# Patient Record
Sex: FEMALE | Race: WHITE | NOT HISPANIC OR LATINO | URBAN - METROPOLITAN AREA
[De-identification: names, ages, dates, MRNs, and addresses within clinical notes are randomized per-mention and may not be internally consistent; named-entity substitution may affect disease eponyms.]

---

## 2023-12-27 ENCOUNTER — APPOINTMENT (OUTPATIENT)
Dept: RADIOLOGY | Facility: HOSPITAL | Age: 68
End: 2023-12-27
Payer: MEDICARE

## 2023-12-27 ENCOUNTER — HOSPITAL ENCOUNTER (EMERGENCY)
Facility: HOSPITAL | Age: 68
Discharge: HOME | End: 2023-12-27
Attending: EMERGENCY MEDICINE
Payer: MEDICARE

## 2023-12-27 VITALS
SYSTOLIC BLOOD PRESSURE: 137 MMHG | HEIGHT: 62 IN | DIASTOLIC BLOOD PRESSURE: 78 MMHG | RESPIRATION RATE: 24 BRPM | HEART RATE: 89 BPM | BODY MASS INDEX: 16.56 KG/M2 | TEMPERATURE: 98.1 F | OXYGEN SATURATION: 98 % | WEIGHT: 90 LBS

## 2023-12-27 DIAGNOSIS — J44.1 COPD EXACERBATION (MULTI): Primary | ICD-10-CM

## 2023-12-27 DIAGNOSIS — Z86.16 HISTORY OF COVID-19: ICD-10-CM

## 2023-12-27 DIAGNOSIS — E87.6 HYPOKALEMIA: ICD-10-CM

## 2023-12-27 LAB
ALBUMIN SERPL-MCNC: 3.6 G/DL (ref 3.5–5)
ALP BLD-CCNC: 49 U/L (ref 35–125)
ALT SERPL-CCNC: 16 U/L (ref 5–40)
ANION GAP BLDA CALCULATED.4IONS-SCNC: 11 MMO/L (ref 10–25)
ANION GAP SERPL CALC-SCNC: 11 MMOL/L
ARTERIAL PATENCY WRIST A: POSITIVE
AST SERPL-CCNC: 34 U/L (ref 5–40)
BASE EXCESS BLDA CALC-SCNC: 0.5 MMOL/L (ref -2–3)
BASOPHILS # BLD AUTO: 0.01 X10*3/UL (ref 0–0.1)
BASOPHILS NFR BLD AUTO: 0.1 %
BILIRUB SERPL-MCNC: 0.3 MG/DL (ref 0.1–1.2)
BODY TEMPERATURE: 37 DEGREES CELSIUS
BUN SERPL-MCNC: 12 MG/DL (ref 8–25)
CA-I BLDA-SCNC: 1.11 MMOL/L (ref 1.1–1.33)
CALCIUM SERPL-MCNC: 8.2 MG/DL (ref 8.5–10.4)
CHLORIDE BLDA-SCNC: 98 MMOL/L (ref 98–107)
CHLORIDE SERPL-SCNC: 95 MMOL/L (ref 97–107)
CO2 SERPL-SCNC: 27 MMOL/L (ref 24–31)
CREAT SERPL-MCNC: 0.6 MG/DL (ref 0.4–1.6)
D DIMER PPP FEU-MCNC: 0.7 MG/L FEU (ref 0.19–0.5)
EOSINOPHIL # BLD AUTO: 0.12 X10*3/UL (ref 0–0.7)
EOSINOPHIL NFR BLD AUTO: 1.4 %
EPAP CMH2O: 5 CM H2O
ERYTHROCYTE [DISTWIDTH] IN BLOOD BY AUTOMATED COUNT: 12.4 % (ref 11.5–14.5)
GFR SERPL CREATININE-BSD FRML MDRD: >90 ML/MIN/1.73M*2
GLUCOSE BLDA-MCNC: 157 MG/DL (ref 74–99)
GLUCOSE SERPL-MCNC: 96 MG/DL (ref 65–99)
HCO3 BLDA-SCNC: 25.4 MMOL/L (ref 22–26)
HCT VFR BLD AUTO: 30.4 % (ref 36–46)
HCT VFR BLD EST: 33 % (ref 36–46)
HGB BLD-MCNC: 10.2 G/DL (ref 12–16)
HGB BLDA-MCNC: 11 G/DL (ref 12–16)
IMM GRANULOCYTES # BLD AUTO: 0.16 X10*3/UL (ref 0–0.7)
IMM GRANULOCYTES NFR BLD AUTO: 1.8 % (ref 0–0.9)
INHALED O2 CONCENTRATION: 50 %
IPAP CMH2O: 15 CM H2O
LACTATE BLDA-SCNC: 2.7 MMOL/L (ref 0.4–2)
LACTATE BLDV-SCNC: 1.7 MMOL/L (ref 0.4–2)
LYMPHOCYTES # BLD AUTO: 1.02 X10*3/UL (ref 1.2–4.8)
LYMPHOCYTES NFR BLD AUTO: 11.6 %
MAGNESIUM SERPL-MCNC: 1.9 MG/DL (ref 1.6–3.1)
MCH RBC QN AUTO: 31.9 PG (ref 26–34)
MCHC RBC AUTO-ENTMCNC: 33.6 G/DL (ref 32–36)
MCV RBC AUTO: 95 FL (ref 80–100)
MONOCYTES # BLD AUTO: 0.94 X10*3/UL (ref 0.1–1)
MONOCYTES NFR BLD AUTO: 10.7 %
NEUTROPHILS # BLD AUTO: 6.57 X10*3/UL (ref 1.2–7.7)
NEUTROPHILS NFR BLD AUTO: 74.4 %
NRBC BLD-RTO: 0 /100 WBCS (ref 0–0)
OXYHGB MFR BLDA: 96.5 % (ref 94–98)
PCO2 BLDA: 41 MM HG (ref 38–42)
PH BLDA: 7.4 PH (ref 7.38–7.42)
PLATELET # BLD AUTO: 301 X10*3/UL (ref 150–450)
PO2 BLDA: 96 MM HG (ref 85–95)
POTASSIUM BLDA-SCNC: 3.5 MMOL/L (ref 3.5–5.3)
POTASSIUM SERPL-SCNC: 3.2 MMOL/L (ref 3.4–5.1)
PROT SERPL-MCNC: 6.4 G/DL (ref 5.9–7.9)
RBC # BLD AUTO: 3.2 X10*6/UL (ref 4–5.2)
SAO2 % BLDA: 98 % (ref 94–100)
SODIUM BLDA-SCNC: 131 MMOL/L (ref 136–145)
SODIUM SERPL-SCNC: 133 MMOL/L (ref 133–145)
SPECIMEN DRAWN FROM PATIENT: ABNORMAL
SPONTANEOUS TIDAL VOLUME: 620 ML
VENTILATOR MODE: ABNORMAL
VENTILATOR RATE: 16 BPM
WBC # BLD AUTO: 8.8 X10*3/UL (ref 4.4–11.3)

## 2023-12-27 PROCEDURE — 36600 WITHDRAWAL OF ARTERIAL BLOOD: CPT

## 2023-12-27 PROCEDURE — 83605 ASSAY OF LACTIC ACID: CPT | Performed by: EMERGENCY MEDICINE

## 2023-12-27 PROCEDURE — 94640 AIRWAY INHALATION TREATMENT: CPT | Mod: 59

## 2023-12-27 PROCEDURE — 85025 COMPLETE CBC W/AUTO DIFF WBC: CPT | Performed by: EMERGENCY MEDICINE

## 2023-12-27 PROCEDURE — 36415 COLL VENOUS BLD VENIPUNCTURE: CPT | Performed by: EMERGENCY MEDICINE

## 2023-12-27 PROCEDURE — 2500000004 HC RX 250 GENERAL PHARMACY W/ HCPCS (ALT 636 FOR OP/ED): Mod: MUE | Performed by: EMERGENCY MEDICINE

## 2023-12-27 PROCEDURE — 2550000001 HC RX 255 CONTRASTS: Performed by: EMERGENCY MEDICINE

## 2023-12-27 PROCEDURE — 83735 ASSAY OF MAGNESIUM: CPT | Performed by: EMERGENCY MEDICINE

## 2023-12-27 PROCEDURE — 9420000001 HC RT PATIENT EDUCATION 5 MIN

## 2023-12-27 PROCEDURE — 71046 X-RAY EXAM CHEST 2 VIEWS: CPT

## 2023-12-27 PROCEDURE — 94660 CPAP INITIATION&MGMT: CPT

## 2023-12-27 PROCEDURE — 99285 EMERGENCY DEPT VISIT HI MDM: CPT | Mod: 25 | Performed by: EMERGENCY MEDICINE

## 2023-12-27 PROCEDURE — 85300 ANTITHROMBIN III ACTIVITY: CPT | Performed by: EMERGENCY MEDICINE

## 2023-12-27 PROCEDURE — 71275 CT ANGIOGRAPHY CHEST: CPT

## 2023-12-27 PROCEDURE — 84075 ASSAY ALKALINE PHOSPHATASE: CPT | Performed by: EMERGENCY MEDICINE

## 2023-12-27 PROCEDURE — 2500000002 HC RX 250 W HCPCS SELF ADMINISTERED DRUGS (ALT 637 FOR MEDICARE OP, ALT 636 FOR OP/ED): Performed by: EMERGENCY MEDICINE

## 2023-12-27 PROCEDURE — 84132 ASSAY OF SERUM POTASSIUM: CPT | Mod: 59 | Performed by: STUDENT IN AN ORGANIZED HEALTH CARE EDUCATION/TRAINING PROGRAM

## 2023-12-27 RX ORDER — PREDNISONE 20 MG/1
40 TABLET ORAL DAILY
Qty: 10 TABLET | Refills: 0 | Status: SHIPPED | OUTPATIENT
Start: 2023-12-27 | End: 2024-01-01

## 2023-12-27 RX ORDER — ALBUTEROL SULFATE 0.83 MG/ML
2.5 SOLUTION RESPIRATORY (INHALATION) ONCE
Status: COMPLETED | OUTPATIENT
Start: 2023-12-27 | End: 2023-12-27

## 2023-12-27 RX ORDER — IPRATROPIUM BROMIDE AND ALBUTEROL SULFATE 2.5; .5 MG/3ML; MG/3ML
3 SOLUTION RESPIRATORY (INHALATION) ONCE
Status: DISCONTINUED | OUTPATIENT
Start: 2023-12-27 | End: 2023-12-27

## 2023-12-27 RX ORDER — IPRATROPIUM BROMIDE AND ALBUTEROL SULFATE 2.5; .5 MG/3ML; MG/3ML
3 SOLUTION RESPIRATORY (INHALATION) EVERY 6 HOURS PRN
Qty: 180 ML | Refills: 0 | Status: SHIPPED | OUTPATIENT
Start: 2023-12-27 | End: 2024-12-26

## 2023-12-27 RX ORDER — ALBUTEROL SULFATE 0.83 MG/ML
2.5 SOLUTION RESPIRATORY (INHALATION) EVERY 6 HOURS PRN
Qty: 120 ML | Refills: 0 | Status: SHIPPED | OUTPATIENT
Start: 2023-12-27 | End: 2024-01-26

## 2023-12-27 RX ORDER — POTASSIUM CHLORIDE 20 MEQ/1
40 TABLET, EXTENDED RELEASE ORAL ONCE
Status: COMPLETED | OUTPATIENT
Start: 2023-12-27 | End: 2023-12-27

## 2023-12-27 RX ADMIN — POTASSIUM CHLORIDE 40 MEQ: 1500 TABLET, EXTENDED RELEASE ORAL at 15:49

## 2023-12-27 RX ADMIN — IOHEXOL 75 ML: 350 INJECTION, SOLUTION INTRAVENOUS at 15:42

## 2023-12-27 RX ADMIN — ALBUTEROL SULFATE 2.5 MG: 2.5 SOLUTION RESPIRATORY (INHALATION) at 14:53

## 2023-12-27 RX ADMIN — ALBUTEROL SULFATE 2.5 MG: 2.5 SOLUTION RESPIRATORY (INHALATION) at 13:31

## 2023-12-27 ASSESSMENT — ENCOUNTER SYMPTOMS
DIAPHORESIS: 0
COUGH: 1
DIZZINESS: 0
PALPITATIONS: 0
WHEEZING: 1
FATIGUE: 0
CHOKING: 0
SHORTNESS OF BREATH: 1
LIGHT-HEADEDNESS: 0
FEVER: 0
CHILLS: 0
CHEST TIGHTNESS: 0

## 2023-12-27 ASSESSMENT — PAIN SCALES - GENERAL
PAINLEVEL_OUTOF10: 0 - NO PAIN

## 2023-12-27 ASSESSMENT — COLUMBIA-SUICIDE SEVERITY RATING SCALE - C-SSRS
1. IN THE PAST MONTH, HAVE YOU WISHED YOU WERE DEAD OR WISHED YOU COULD GO TO SLEEP AND NOT WAKE UP?: NO
2. HAVE YOU ACTUALLY HAD ANY THOUGHTS OF KILLING YOURSELF?: NO
6. HAVE YOU EVER DONE ANYTHING, STARTED TO DO ANYTHING, OR PREPARED TO DO ANYTHING TO END YOUR LIFE?: NO

## 2023-12-27 ASSESSMENT — PAIN - FUNCTIONAL ASSESSMENT: PAIN_FUNCTIONAL_ASSESSMENT: 0-10

## 2023-12-27 NOTE — DISCHARGE INSTRUCTIONS
Return to the ER if you develop worsening symptoms despite home oxygen, chest pain, fever, or if you develop any other new / concerning symptoms.

## 2023-12-27 NOTE — ED PROVIDER NOTES
HPI   Chief Complaint   Patient presents with   • Shortness of Breath     Presents to ED via EMS for increased shortness of breath over the past 2 days.  States history of COPD and wears 5 liters NC at all times.  States covid + 1 week ago Friday.         68-year-old female presents to the emergency department with a complaint of worsening shortness of breath and cough.  Diagnosed with COVID 1 week ago after having URI symptoms.  She has a history of COPD and is oxygen dependent.  She wears 5 L nasal cannula.  She does not have a nebulizer machine at home but has been using her albuterol inhaler with minimal relief.        Review of Systems   Constitutional:  Negative for chills, diaphoresis, fatigue and fever.   Respiratory:  Positive for cough, shortness of breath and wheezing. Negative for choking and chest tightness.    Cardiovascular:  Negative for chest pain, palpitations and leg swelling.   Neurological:  Negative for dizziness, syncope and light-headedness.   All other systems reviewed and are negative.        Patient History   Past Medical History:   Diagnosis Date   • COPD (chronic obstructive pulmonary disease) (CMS/[facility], located within [facility])      History reviewed. No pertinent surgical history.  No family history on file.  Social History     Tobacco Use   • Smoking status: Former     Types: Cigarettes   • Smokeless tobacco: Never   Substance Use Topics   • Alcohol use: Not on file   • Drug use: Not on file       Physical Exam   ED Triage Vitals [12/27/23 1255]   Temp Heart Rate Resp BP   36.7 °C (98.1 °F) (!) 119 20 (!) 152/118      SpO2 Temp Source Heart Rate Source Patient Position   94 % Oral -- --      BP Location FiO2 (%)     -- --       Physical Exam  Vitals reviewed.   Constitutional:       General: She is not in acute distress.     Appearance: She is well-developed. She is not ill-appearing, toxic-appearing or diaphoretic.   HENT:      Head: Normocephalic and atraumatic.   Cardiovascular:      Rate and Rhythm: Regular  rhythm. Tachycardia present.   Pulmonary:      Effort: Tachypnea present. No accessory muscle usage or respiratory distress.      Breath sounds: No stridor. Decreased breath sounds and wheezing present. No rhonchi or rales.   Musculoskeletal:         General: Normal range of motion.      Right lower leg: No edema.      Left lower leg: No edema.   Skin:     General: Skin is warm and dry.      Coloration: Skin is not cyanotic or pale.   Neurological:      Mental Status: She is alert and oriented to person, place, and time.         ED Course & MDM   ED Course as of 12/27/23 1544   Wed Dec 27, 2023   1501 XR chest 2 views  FINDINGS:  Heart is not enlarged. The study is limited however due to patient  rotation on the AP projection. Lung volumes are increased with  increased retrosternal airspace and flattened hemidiaphragms due to  pulmonary emphysema. No acute infiltrate or airspace consolidation is  observed. No pleural abnormality is seen.      There is anterior wedging of an upper thoracic vertebral body  unchanged.      IMPRESSION:  Pulmonary emphysema without acute cardiopulmonary process.      Signed by: Isamar Pal 12/27/2023 2:40 PM   [TJ]   1520 POCT Lactate, Venous: 1.7 [TJ]   1520 MAGNESIUM: 1.90 [TJ]   1520 D-Dimer Non VTE, Quant (mg/L FEU)(!): 0.70 [TJ]   1520 POTASSIUM(!): 3.2 [TJ]   1520 SODIUM: 133 [TJ]   1520 Blood Urea Nitrogen: 12 [TJ]   1520 Creatinine: 0.60 [TJ]   1520 HEMOGLOBIN(!): 10.2 [TJ]   1520 HEMATOCRIT(!): 30.4 [TJ]   1520 WBC: 8.8 [TJ]   1536 Twelve-lead EKG reviewed and interpreted by myself  Rate 102, sinus rhythm, normal axis, LVH, intervals within normal limits, no ST segment elevations, ST segment depressions with strain pattern, motion artifact  No previous EKGs for comparison in EMR [TJ]      ED Course User Index  [TJ] Kaia REYES MD       Medical Decision Making  Worsening cough and shortness of breath  Recently diagnosed with pneumonia.  History of COPD oxygen dependent 5 L  nasal cannula continuous  Differential diagnosis: Pneumonia, scarring from COVID, respiratory insufficiency, pulmonary embolism, pulmonary edema, pleural effusion  Exam findings, ER workup, and treatment plan discussed at bedside.  The patient received 1 DuoNeb treatment and 125 mg of Solu-Medrol in route by EMS.  He states that the DuoNeb treatment did help out with her breathing and decrease the cough.  The patient does not have a nebulizer machine at home she only has an albuterol inhaler.  3:16 PM  Patient was off her oxygen for an unknown period of time after returning from radiology.  She was feeling better during her second albuterol treatment comfortable enough to go home.  Labs discussed with the patient and I informed her 1 lab was still pending which is a D-dimer.  Approximately 10 minutes after leaving the room patient began feeling anxious and felt like she was not able to get air in.  On evaluation she has decreased air exchange.  Tachycardic.  Complaints of chest pain, chest tightness, or palpitations.  Her D-dimer results have returned and it is elevated.  Discussed BiPAP with the patient if needed.  Patient agreeable with that plan.  CT angio of the chest PE protocol ordered.  3:34 PM  Patient is more comfortable now on BiPAP.  She is less anxious.  Heart rate is trending down.  She is no longer complaining of the inability to get her in her lungs  CT angio of the chest is pending.  Patient care signed over to  pending ABG results, CT angio chest results, reevaluation, and disposition.    Amount and/or Complexity of Data Reviewed  Labs: ordered. Decision-making details documented in ED Course.  Radiology: ordered. Decision-making details documented in ED Course.  ECG/medicine tests: ordered. Decision-making details documented in ED Course.        Procedure  Procedures     Kaia REYES MD  12/27/23 1539       Kaia REYES MD  12/27/23 1546

## 2023-12-27 NOTE — ED PROVIDER NOTES
Pt endorsed to me pending CT PE study and re eval. CT study negative for PE. ABG reassuring, pt was trialed off of bipap and on home O2 appears to be doing much better. Did d/w pt obs admit for monitoring vs DC home w/ Rx for COPD exac. Pt states she is comfortable w/ DC home provided she can get Rx for a nebulizer. Pt provided Rx, will have pt FU in primary clinic for re eval in the next 1-2 days for re eval. Pt encouraged to see her pulmonologist as well but declining, states she prefers to see the PCP instead. RTER precautions were d/w pt who verbalized understanding and agreement w/ plan.      Yaron Guevara MD  12/27/23 0970

## 2024-07-09 ENCOUNTER — APPOINTMENT (OUTPATIENT)
Dept: PRIMARY CARE | Facility: CLINIC | Age: 69
End: 2024-07-09
Payer: MEDICARE

## 2025-06-02 DIAGNOSIS — J44.9 COPD (CHRONIC OBSTRUCTIVE PULMONARY DISEASE) (MULTI): Primary | ICD-10-CM

## 2025-06-24 ENCOUNTER — APPOINTMENT (OUTPATIENT)
Dept: CARDIAC REHAB | Facility: CLINIC | Age: 70
End: 2025-06-24
Payer: MEDICARE

## 2025-06-30 ENCOUNTER — APPOINTMENT (OUTPATIENT)
Dept: CARDIAC REHAB | Facility: CLINIC | Age: 70
End: 2025-06-30
Payer: MEDICARE

## 2025-08-01 ENCOUNTER — CLINICAL SUPPORT (OUTPATIENT)
Dept: CARDIAC REHAB | Facility: CLINIC | Age: 70
End: 2025-08-01
Payer: MEDICARE

## 2025-08-01 VITALS
DIASTOLIC BLOOD PRESSURE: 60 MMHG | BODY MASS INDEX: 17.48 KG/M2 | WEIGHT: 95 LBS | HEIGHT: 62 IN | SYSTOLIC BLOOD PRESSURE: 104 MMHG | OXYGEN SATURATION: 97 %

## 2025-08-01 DIAGNOSIS — J44.9 CHRONIC OBSTRUCTIVE PULMONARY DISEASE, UNSPECIFIED COPD TYPE (MULTI): Primary | ICD-10-CM

## 2025-08-01 RX ORDER — DOXYCYCLINE 100 MG/1
1 CAPSULE ORAL
COMMUNITY
Start: 2024-11-26

## 2025-08-01 RX ORDER — CYCLOBENZAPRINE HCL 10 MG
10 TABLET ORAL 3 TIMES DAILY
COMMUNITY
Start: 2025-07-30

## 2025-08-01 RX ORDER — ISOSORBIDE MONONITRATE 20 MG/1
20 TABLET ORAL
COMMUNITY
Start: 2025-05-20

## 2025-08-01 RX ORDER — ATORVASTATIN CALCIUM 10 MG/1
10 TABLET, FILM COATED ORAL
COMMUNITY
Start: 2024-08-27 | End: 2025-08-27

## 2025-08-01 RX ORDER — DOXYCYCLINE 100 MG/1
1 CAPSULE ORAL
COMMUNITY
Start: 2025-01-12

## 2025-08-01 RX ORDER — AMLODIPINE BESYLATE 2.5 MG/1
2.5 TABLET ORAL
COMMUNITY
Start: 2025-07-21

## 2025-08-01 RX ORDER — LEVOFLOXACIN 500 MG/1
1 TABLET, FILM COATED ORAL
COMMUNITY
Start: 2024-08-27

## 2025-08-01 RX ORDER — GABAPENTIN 100 MG/1
100 CAPSULE ORAL 3 TIMES DAILY
COMMUNITY
Start: 2025-07-21 | End: 2025-08-20

## 2025-08-01 RX ORDER — IPRATROPIUM BROMIDE AND ALBUTEROL 20; 100 UG/1; UG/1
1 SPRAY, METERED RESPIRATORY (INHALATION)
COMMUNITY

## 2025-08-01 RX ORDER — HYDROCODONE BITARTRATE AND ACETAMINOPHEN 5; 325 MG/1; MG/1
1 TABLET ORAL DAILY
COMMUNITY
Start: 2024-08-05

## 2025-08-01 RX ORDER — DIAZEPAM 5 MG/1
5 TABLET ORAL EVERY 8 HOURS PRN
COMMUNITY
Start: 2025-07-08 | End: 2025-08-07

## 2025-08-01 RX ORDER — OLMESARTAN MEDOXOMIL 40 MG/1
40 TABLET ORAL
COMMUNITY
Start: 2024-10-07

## 2025-08-01 RX ORDER — BENZOCAINE .13; .15; .5; 2 G/100G; G/100G; G/100G; G/100G
1 GEL ORAL
COMMUNITY
Start: 2025-04-28

## 2025-08-01 RX ORDER — BUDESONIDE AND FORMOTEROL FUMARATE DIHYDRATE 160; 4.5 UG/1; UG/1
2 AEROSOL RESPIRATORY (INHALATION) 2 TIMES DAILY
COMMUNITY
Start: 2025-02-04

## 2025-08-01 RX ORDER — MONTELUKAST SODIUM 10 MG/1
10 TABLET ORAL NIGHTLY
COMMUNITY
Start: 2025-07-02 | End: 2025-09-30

## 2025-08-01 RX ORDER — ESCITALOPRAM OXALATE 20 MG/1
20 TABLET ORAL
COMMUNITY
Start: 2024-08-27

## 2025-08-01 ASSESSMENT — PATIENT HEALTH QUESTIONNAIRE - PHQ9
1. LITTLE INTEREST OR PLEASURE IN DOING THINGS: NOT AT ALL
6. FEELING BAD ABOUT YOURSELF - OR THAT YOU ARE A FAILURE OR HAVE LET YOURSELF OR YOUR FAMILY DOWN: SEVERAL DAYS
4. FEELING TIRED OR HAVING LITTLE ENERGY: SEVERAL DAYS
3. TROUBLE FALLING OR STAYING ASLEEP OR SLEEPING TOO MUCH: SEVERAL DAYS
SUM OF ALL RESPONSES TO PHQ QUESTIONS 1-9: 4
SUM OF ALL RESPONSES TO PHQ9 QUESTIONS 1 & 2: 1
SUM OF ALL RESPONSES TO PHQ QUESTIONS 1-9: 4
2. FEELING DOWN, DEPRESSED OR HOPELESS: SEVERAL DAYS
8. MOVING OR SPEAKING SO SLOWLY THAT OTHER PEOPLE COULD HAVE NOTICED. OR THE OPPOSITE, BEING SO FIGETY OR RESTLESS THAT YOU HAVE BEEN MOVING AROUND A LOT MORE THAN USUAL: NOT AT ALL
5. POOR APPETITE OR OVEREATING: NOT AT ALL
7. TROUBLE CONCENTRATING ON THINGS, SUCH AS READING THE NEWSPAPER OR WATCHING TELEVISION: NOT AT ALL
9. THOUGHTS THAT YOU WOULD BE BETTER OFF DEAD, OR OF HURTING YOURSELF: NOT AT ALL

## 2025-08-01 ASSESSMENT — DUKE ACTIVITY SCORE INDEX (DASI)
CAN YOU HAVE SEXUAL RELATIONS: NO
CAN YOU DO LIGHT WORK AROUND THE HOUSE LIKE DUSTING OR WASHING DISHES: YES
CAN YOU RUN A SHORT DISTANCE: NO
CAN YOU TAKE CARE OF YOURSELF (EAT, DRESS, BATHE, OR USE TOILET): YES
CAN YOU WALK A BLOCK OR TWO ON LEVEL GROUND: NO
CAN YOU CLIMB A FLIGHT OF STAIRS OR WALK UP A HILL: NO
CAN YOU DO HEAVY WORK AROUND THE HOUSE LIKE SCRUBBING FLOORS OR LIFTING AND MOVING HEAVY FURNITURE: NO
TOTAL_SCORE: 7.2
CAN YOU DO YARD WORK LIKE RAKING LEAVES, WEEDING OR PUSHING A MOWER: NO
CAN YOU PARTICIPATE IN MODERATE RECREATIONAL ACTIVITIES LIKE GOLF, BOWLING, DANCING, DOUBLES TENNIS OR THROWING A BASEBALL OR FOOTBALL: NO
CAN YOU DO MODERATE WORK AROUND THE HOUSE LIKE VACUUMING, SWEEPING FLOORS OR CARRYING GROCERIES: NO
DASI METS SCORE: 3.6
CAN YOU PARTICIPATE IN STRENOUS SPORTS LIKE SWIMMING, SINGLES TENNIS, FOOTBALL, BASKETBALL, OR SKIING: NO
CAN YOU WALK INDOORS, SUCH AS AROUND YOUR HOUSE: YES

## 2025-08-01 NOTE — PROGRESS NOTES
Pulmonary Rehabilitation Initial Treatment Plan    Name: Teena Coe   Medical Record Number: [id number]  YOB: 1955   Age: 70 y.o.  Today’s Date: 8/1/2025   Primary Care Physician: No primary care provider on file.   Referring Physician: Karthik Kwon DO/ Jaspal Noriega MD  Program Location: [address]  Primary Diagnosis: Chronic obstructive pulmonary disease, unspecified COPD type (Multi) [J44.9]   Onset/Date of Diagnosis: 2004  Session #: 0  AACVPR Risk Stratification: moderate  Falls Risk: Low    ------------------------------------------------------------------  Psychosocial Initial Assessment:  ------------------------------------------------------------------  Stress Assessment  Pre PHQ-9: 4   Sent PH-Q 9 to MD if score > 20: No; score < 20  Quality of Life Survey:   Pre CAT score: 25  Post CAT score: Survey to be given at discharge.   Pt reported/currently experiencing stress: No  Patient uses stress management skills: No   History of: depression  Currently seeing a mental health provider: No  Social Support: Yes, Whom:Family and Neil    Learning Assessment  Learning assessment/barriers: None  Preferred learning method: Auditory, Visual, Reading handout, and Writing handout  Barriers: None  Comments:  Stages of Change: Preparation    Psychosocial Plan  Goal Status: Initial Assessment; goals not yet started  Psychosocial Goals: Maintain or lower PH-Q 9 score by discharge and Identify strategies for managing depression  Psychosocial Interventions/Education:   To be done in Pulmonary Rehab.     ------------------------------------------------------------------  Oxygen Initial Assessment:  ------------------------------------------------------------------  SpO2 at rest: 99 %  SpO2 with exertion: 100 %    Oxygen Use  Supplemental O2 prescribed: Yes,   Liters at rest: 5 L  Liters with exertion: 5 L    SpO2 at rest: 99 %  SpO2 with exertion: 100 %    Using O2 as  "prescribed: Yes  DME: lori      Demonstrates appropriate knowledge of O2 use: Yes   Demonstrates appropriate knowledge of O2 safety: Yes     Home O2 System: Concentrator  Portable O2 System: Portable Tank  Hypoxia managed: Yes   Home Pulse Oximeter: Yes     Pre MMRC: 4  Post MMRC: To be done at discharge.     Oxygen Plan  Goal Status: Initial Assessment; goals not yet started  Oxygen Goals: Decrease MMRC score, Learn and use diaphragmatic breathing as needed, Learn and use pursed lip breathing as needed, and Titrate supplemental O2 as needed to keep SpO2 at 88% or greater  Oxygen Education/Interventions:   To be done in Pulmonary Rehab.    ------------------------------------------------------------------  Nutrition Initial Assessment:  ------------------------------------------------------------------  Diet Habit Survey: Picture Your Plate  Pre: Initial survey given. Pending completion and return from patient.  Post: To be done at discharge.  Survey results reviewed with Dietician: Not at this time    Lipids Assessment  Current Dietary Guidelines: no special  Barriers to dietary change: no  Hyperlipidemia: No   No results found for: \"CHOL\", \"HDL\", \"LDLCALC\", \"TRIG\"  Diabetes Assessment  History of Diabetes: No  Lab Results   Component Value Date    HGBA1C 5.0 10/03/2022      Weight Management  Weight: (!) 43.1 kg (95 lb)  Height: 157.5 cm (5' 2\")  BMI (Calculated): 17.37   Nutrition Plan  Goal Status: Initial Assessment; goals not yet started  Nutrition Goals: Improve Diet Habit Survey score by 5-10 points by discharge, Adapt a low-sodium, DASH diet prior to discharge, and Learn how to read and interpret nutrition labels prior to discharge  Nutrition Interventions/Education:   To be done in Pulmonary Rehab.    ------------------------------------------------------------------  Exercise Initial Assessment:  ------------------------------------------------------------------  Home Exercise  Current Home Exercise?: " No  Mode: NA  Frequency: NA  Duration: NA   Home Exercise Prescription given: To be given prior to discharge from program.    6 Minute Walk Assessment   6 MWT distance:     FiO2 used during test: 6 Liters    Exercise Prescription  Exercise Prescription based on: 6 mwt  Resistance Training: No   Frequency:  2 days/week  Mode: Treadmill, NuStep, and Recumbent Cycle  Duration: 24 total aerobic minutes  Intensity: RPE 12-16  Target HR:  RPE/RPD  MET Level: 1.8  Patient wears supplemental O2: Yes;   O2 Flow Rate: 3 to 6 L/min  SpO2 Range: 88 to 99 %  Modality Workload METs Duration (minutes)   Pre-Exercise   2:00   Treadmill 1 mph @ 0 % 1.8 8 :00   NuStep 4000 1 load @ 24 mann 1.8 8 :00   Recumbent Bike 1 load @ 45 rpms 1.5 8 :00   Post-Exercise                 2:00        Exercise Plan  Goal Status: Initial Assessment; goals not yet started  Exercise Goals: Increase exercise MET level by 5-10% each week, Increase total exercise duration to 30-45 minutes, Initiate strength training 2-3 days a week, and Establish a home exercise program before discharge  Exercise Interventions/Education:   To be done in Pulmonary Rehab.    ------------------------------------------------------------------  Other Core Components/Risk Factor Initial Assessment:  ------------------------------------------------------------------  Medication adherence  Medication compliance: Yes  Uses pill box/organizer: Yes   Carries medication list: No   Uses Inhalers appropriately: will review  Current Medications:   Medication Documentation Review Audit       Reviewed by Court Haro RN (Registered Nurse) on 08/01/25 at 1333      Medication Order Taking? Sig Documenting Provider Last Dose Status   albuterol 2.5 mg /3 mL (0.083 %) nebulizer solution [id number] Yes Take 3 mL (2.5 mg) by nebulization every 6 hours if needed for wheezing. Kaia REYES MD  Active   amLODIPine (Norvasc) 2.5 mg tablet [id number] Yes Take 1 tablet (2.5 mg) by mouth once  daily. Historical Provider, MD  Active   atorvastatin (Lipitor) 10 mg tablet [id number] Yes Take 1 tablet (10 mg) by mouth once daily. Historical Provider, MD  Active   budesonide (Rhinocort AQ) 32 mcg/actuation nasal spray [id number] Yes 1 spray by Does not apply route once daily. Historical Provider, MD  Active   budesonide-formoterol (Symbicort) 160-4.5 mcg/actuation inhaler [id number] Yes Inhale 2 puffs 2 times a day. Historical Provider, MD  Active   cyclobenzaprine (Flexeril) 10 mg tablet [id number] Yes Take 1 tablet (10 mg) by mouth 3 times a day. Historical Provider, MD  Active   diazePAM (Valium) 5 mg tablet [id number] Yes Take 1 tablet (5 mg) by mouth every 8 hours if needed. Historical Provider, MD  Active   doxycycline (Monodox) 100 mg capsule [id number] Yes Take 1 capsule (100 mg) by mouth every 12 hours. Historical Provider, MD  Active   doxycycline (Vibramycin) 100 mg capsule [id number] Yes Take 1 capsule (100 mg) by mouth every 12 hours. Historical Provider, MD  Active   escitalopram (Lexapro) 20 mg tablet [id number] Yes Take 1 tablet (20 mg) by mouth once daily. Historical Provider, MD  Active   fluticasone-umeclidin-vilanter (TRELEGY-ELLIPTA) 200-62.5-25 mcg blister with device [id number] Yes Inhale 1 puff once daily. Historical Provider, MD  Active   gabapentin (Neurontin) 100 mg capsule [id number] Yes Take 1 capsule (100 mg) by mouth 3 times a day. Historical Provider, MD  Active   HYDROcodone-acetaminophen (Norco) 5-325 mg tablet [id number] Yes Take 1 tablet by mouth once daily. Historical Provider, MD  Active   ipratropium-albuteroL (Combivent Respimat)  mcg/actuation inhaler [id number] Yes Inhale 1 puff 4 times a day. Historical Provider, MD  Active   ipratropium-albuteroL (Duo-Neb) 0.5-2.5 mg/3 mL nebulizer solution [id number] Yes Take 3 mL by nebulization every 6 hours if needed for wheezing or shortness of breath (cough). Kaia REYES MD  Active   isosorbide mononitrate 20 mg tablet [id number]  "Yes Take 1 tablet (20 mg) by mouth once daily. Historical Provider, MD  Active   levoFLOXacin (Levaquin) 500 mg tablet [id number] Yes Take 1 tablet (500 mg) by mouth early in the morning.. Historical Provider, MD  Active   montelukast (Singulair) 10 mg tablet [id number] Yes Take 1 tablet (10 mg) by mouth once daily at bedtime. Historical Provider, MD  Active   olmesartan (BENIcar) 40 mg tablet [id number] Yes Take 1 tablet (40 mg) by mouth once daily. Historical Provider, MD  Active   PREDNISONE ORAL [id number] Yes Take 2.5 mg by mouth once daily. Historical Provider, MD  Active                   Blood Pressure Management  History of Hypertension: Yes   Medication Changes: No   Resting BP:  /60   Ht 1.575 m (5' 2\")   Wt (!) 43.1 kg (95 lb)   SpO2 97%   BMI 17.38 kg/m²    Heart Failure Management  Hx of Heart Failure: No  Smoking/Tobacco Assessment  Social History[1]   Other Core Component Plan  Goal Status: Initial Assessment; goals not yet started  Other Core Component Goals: Medication compliance, Verbalize medication usage and drug actions by discharge, and Achieve resting BP of < 130/80 by discharge  Other Core Component Interventions/Education:   To be done in Pulmonary Rehab.    Individual Patient Goals:  Attending rehab at least 2 days/week   Goal Status: Initial Assessment; goals not yet started    Staff Comments:  Initial assessment done In Person at [facility].      Rehab Staff Signature: Court Haro RN         [1]   Social History  Tobacco Use    Smoking status: Former     Types: Cigarettes    Smokeless tobacco: Never     "

## 2025-08-04 DIAGNOSIS — J44.9 CHRONIC OBSTRUCTIVE PULMONARY DISEASE, UNSPECIFIED COPD TYPE (MULTI): Primary | ICD-10-CM

## 2025-08-05 ENCOUNTER — CLINICAL SUPPORT (OUTPATIENT)
Dept: CARDIAC REHAB | Facility: CLINIC | Age: 70
End: 2025-08-05
Payer: MEDICARE

## 2025-08-05 DIAGNOSIS — J44.9 CHRONIC OBSTRUCTIVE PULMONARY DISEASE, UNSPECIFIED COPD TYPE (MULTI): ICD-10-CM

## 2025-08-05 PROCEDURE — 94626 PHY/QHP OP PULM RHB W/MNTR: CPT | Performed by: INTERNAL MEDICINE

## 2025-08-08 ENCOUNTER — CLINICAL SUPPORT (OUTPATIENT)
Dept: CARDIAC REHAB | Facility: CLINIC | Age: 70
End: 2025-08-08
Payer: MEDICARE

## 2025-08-08 DIAGNOSIS — J44.9 CHRONIC OBSTRUCTIVE PULMONARY DISEASE, UNSPECIFIED COPD TYPE (MULTI): ICD-10-CM

## 2025-08-08 PROCEDURE — 94626 PHY/QHP OP PULM RHB W/MNTR: CPT | Performed by: INTERNAL MEDICINE

## 2025-08-08 NOTE — PROGRESS NOTES
Pulmonary Rehab Education  Teena Coe   [id number]    8/8/2025  Discussion of prescribed drug names, doses, side effects, and medication uses was done with patient. Patient's medication list was reviewed with patient and copy of current medication list was given to patient in addition to education handout, Medications Used for Cardiac Conditions. Patient was instructed to come back with any questions.             Signature Court Haro RN

## 2025-08-12 ENCOUNTER — APPOINTMENT (OUTPATIENT)
Dept: CARDIAC REHAB | Facility: CLINIC | Age: 70
End: 2025-08-12
Payer: MEDICARE

## 2025-08-15 ENCOUNTER — CLINICAL SUPPORT (OUTPATIENT)
Dept: CARDIAC REHAB | Facility: CLINIC | Age: 70
End: 2025-08-15
Payer: MEDICARE

## 2025-08-15 DIAGNOSIS — J44.9 CHRONIC OBSTRUCTIVE PULMONARY DISEASE, UNSPECIFIED COPD TYPE (MULTI): ICD-10-CM

## 2025-08-15 PROCEDURE — 94626 PHY/QHP OP PULM RHB W/MNTR: CPT | Performed by: INTERNAL MEDICINE

## 2025-08-19 ENCOUNTER — CLINICAL SUPPORT (OUTPATIENT)
Dept: CARDIAC REHAB | Facility: CLINIC | Age: 70
End: 2025-08-19
Payer: MEDICARE

## 2025-08-19 DIAGNOSIS — J44.9 CHRONIC OBSTRUCTIVE PULMONARY DISEASE, UNSPECIFIED COPD TYPE (MULTI): ICD-10-CM

## 2025-08-19 PROCEDURE — 94626 PHY/QHP OP PULM RHB W/MNTR: CPT | Performed by: INTERNAL MEDICINE

## 2025-08-22 ENCOUNTER — CLINICAL SUPPORT (OUTPATIENT)
Dept: CARDIAC REHAB | Facility: CLINIC | Age: 70
End: 2025-08-22
Payer: MEDICARE

## 2025-08-22 DIAGNOSIS — J44.9 CHRONIC OBSTRUCTIVE PULMONARY DISEASE, UNSPECIFIED COPD TYPE (MULTI): ICD-10-CM

## 2025-08-22 PROCEDURE — 94626 PHY/QHP OP PULM RHB W/MNTR: CPT | Performed by: INTERNAL MEDICINE

## 2025-08-26 ENCOUNTER — DOCUMENTATION (OUTPATIENT)
Dept: CARDIAC REHAB | Facility: CLINIC | Age: 70
End: 2025-08-26
Payer: MEDICARE

## 2025-08-26 ENCOUNTER — CLINICAL SUPPORT (OUTPATIENT)
Dept: CARDIAC REHAB | Facility: CLINIC | Age: 70
End: 2025-08-26
Payer: MEDICARE

## 2025-08-26 DIAGNOSIS — J44.9 CHRONIC OBSTRUCTIVE PULMONARY DISEASE, UNSPECIFIED COPD TYPE (MULTI): ICD-10-CM

## 2025-08-26 PROCEDURE — 94626 PHY/QHP OP PULM RHB W/MNTR: CPT | Performed by: INTERNAL MEDICINE

## 2025-08-29 ENCOUNTER — CLINICAL SUPPORT (OUTPATIENT)
Dept: CARDIAC REHAB | Facility: CLINIC | Age: 70
End: 2025-08-29
Payer: MEDICARE

## 2025-08-29 DIAGNOSIS — J44.9 CHRONIC OBSTRUCTIVE PULMONARY DISEASE, UNSPECIFIED COPD TYPE (MULTI): ICD-10-CM

## 2025-08-29 PROCEDURE — 94626 PHY/QHP OP PULM RHB W/MNTR: CPT | Performed by: INTERNAL MEDICINE

## 2025-09-05 ENCOUNTER — CLINICAL SUPPORT (OUTPATIENT)
Dept: CARDIAC REHAB | Facility: CLINIC | Age: 70
End: 2025-09-05
Payer: MEDICARE

## 2025-09-05 DIAGNOSIS — J44.9 CHRONIC OBSTRUCTIVE PULMONARY DISEASE, UNSPECIFIED COPD TYPE (MULTI): ICD-10-CM

## 2025-09-05 PROCEDURE — 94626 PHY/QHP OP PULM RHB W/MNTR: CPT | Performed by: INTERNAL MEDICINE
